# Patient Record
Sex: MALE | Race: WHITE
[De-identification: names, ages, dates, MRNs, and addresses within clinical notes are randomized per-mention and may not be internally consistent; named-entity substitution may affect disease eponyms.]

---

## 2017-10-22 ENCOUNTER — HOSPITAL ENCOUNTER (EMERGENCY)
Dept: HOSPITAL 25 - UCEAST | Age: 52
Discharge: HOME | End: 2017-10-22
Payer: COMMERCIAL

## 2017-10-22 VITALS — SYSTOLIC BLOOD PRESSURE: 153 MMHG | DIASTOLIC BLOOD PRESSURE: 98 MMHG

## 2017-10-22 DIAGNOSIS — L03.114: Primary | ICD-10-CM

## 2017-10-22 DIAGNOSIS — R03.0: ICD-10-CM

## 2017-10-22 PROCEDURE — 99212 OFFICE O/P EST SF 10 MIN: CPT

## 2017-10-22 PROCEDURE — G0463 HOSPITAL OUTPT CLINIC VISIT: HCPCS

## 2017-10-22 NOTE — UC
Neelam COSME Emily, scribed for Carmen Devries DO on 10/22/17 at 1039 .





Skin Complaint HPI





- HPI Summary


HPI Summary: 


This patient is a 52 year old M presenting to urgent care with a chief 

complaint of redness and swelling at left elbow that began 5 days ago. The CC 

is described as a constant burn. The patient rates the pain 6/10 in severity. 

Symptoms aggravated by movement and touch. Symptoms alleviated by nothing. 

Patient reports pain at L elbow, chills, and nausea. Patient denies fever and 

diaphoresis. Patient denies any recent injury or animal bite. Medications 

reviewed this visit.








- History of Current Complaint


Chief Complaint: UCSkin


Time Seen by Provider: 10/22/17 10:29


Stated Complaint: INFECTION L ARM


Hx Obtained From: Patient


Onset/Duration: Sudden Onset, Lasting Days, Still Present


Onset Severity: Moderate


Current Severity: Moderate


Pain Intensity: 6


Pain Scale Used: 0-10 Numeric


Location: Other - L elbow


Character: Swelling, Pain, Redness


Aggravating Factor(s): Touch, Other - Movement


Alleviating Factor(s): Nothing


Associated Signs & Symptoms: Positive: Nausea - resolved, Chills - resolved.  

Negative: Diaphoresis, Fever





- Allergy/Home Medications


Allergies/Adverse Reactions: 


 Allergies











Allergy/AdvReac Type Severity Reaction Status Date / Time


 


No Known Allergies Allergy   Verified 10/22/17 10:27














Review of Systems


Constitutional: Chills, Other - Negative fever


Skin: Other - Positive redness and swelling at L elbow. Negative diaphoresis


Gastrointestinal: Nausea


Musculoskeletal: Other: - Positive pain at L elbow


All Other Systems Reviewed And Are Negative: Yes





PMH/Surg Hx/FS Hx/Imm Hx





- Additional Past Medical History


Additional PMH: 


Shingles


Negative MRSA


Previously Healthy: No


Other Endocrine History: Negative diabetes


Other History Of: 


   Negative For: HIV





- Surgical History


Surgical History: None





- Family History


Known Family History: Positive: Cardiac Disease





- Social History


Occupation: Employed Full-time


Lives: With Family


Alcohol Use: Occasionally


Substance Use Type: None


Smoking Status (MU): Never Smoked Tobacco





- Immunization History


Most Recent Tetanus Shot: 2007





Physical Exam


Triage Information Reviewed: Yes


Appearance: Well-Appearing, No Pain Distress, Well-Nourished


Vital Signs: 


 Initial Vital Signs











Temp  98.5 F   10/22/17 10:18


 


Pulse  72   10/22/17 10:18


 


Resp  16   10/22/17 10:18


 


BP  153/98   10/22/17 10:18


 


Pulse Ox  99   10/22/17 10:18











Vital Signs Reviewed: Yes


Eyes: Positive: Conjunctiva Clear.  Negative: Discharge


ENT: Positive: Hearing grossly normal.  Negative: Muffled/hoarse voice


Neck exam: Normal


Neck: Positive: Supple


Respiratory: Positive: Lungs clear, Normal breath sounds, No respiratory 

distress, No accessory muscle use


Cardiovascular: Positive: RRR, No Murmur


Musculoskeletal Exam: Normal


Neurological: Positive: Alert, Muscle Tone Normal


Psychological Exam: Normal


Psychological: Positive: Age Appropriate Behavior


Skin Exam: Normal


Skin: Positive: Other - Warm, Dry, Normal Color





Course/Dx





- Course


Course Of Treatment: This patient is a 52 year old M presenting to urgent care 

with a chief complaint of redness and swelling at left elbow that began 5 days 

ago. The CC is described as a constant burn. The patient rates the pain 6/10 in 

severity. Symptoms aggravated by movement and touch. Symptoms alleviated by 

nothing. Patient reports pain at L elbow, chills, and nausea. Patient denies 

fever and diaphoresis. Patient denies any recent injury or animal bite. 

Medications reviewed this visit.  Physical Exam Findings.  13x10 cm indurated, 

erythematous, tender lesion positive for calor. No fluctuance. Central area of 

skin breakdown that is not draining.  Medical Decision Making.  High blood 

pressure noted.  Patient will be discharged with prescription for Keflex and 

Bactrim with follow up from PCP.  The patient is agreeable with this plan.





- Differential Diagnoses - Skin Complaint


Differential Diagnoses: Abscess, Cellulitis, Contact Dermatitis, Poison Ivy, 

Urticaria, Varicella Zoster





- Diagnoses


Provider Diagnoses: Cellulitis. Abscess. Elevated blood pressure without 

diagnosis of hypertension.





Discharge





- Discharge Plan


Condition: Stable


Disposition: HOME


Prescriptions: 


Acetaminop/Codeine 30 MG TAB* [Tylenol/Codeine 30 MG TAB*] 1 tab PO BEDTIME PRN 

#5 tab MDD 1 tab


 PRN Reason: Pain


Cephalexin CAP* [Keflex CAP*] 500 mg PO BID #20 cap


Sulfamethox/Trimethoprim DS* [Bactrim /160 TAB*] 1 tab PO BID #20 tab


Patient Education Materials:  Cellulitis (ED), Abscess (ED)


Referrals: 


BRIANNA Diaz [Primary Care Provider] - 2 Days


Additional Instructions: 


Your blood pressure was elevated at this visit. That does not mean you have 

hypertension, it is probably due to your current condition. Please follow up 

with your primary care provider





The documentation as recorded by the Neelam fleming Emily accurately reflects the 

service I personally performed and the decisions made by me, Carmen Devries DO.

## 2018-08-20 ENCOUNTER — HOSPITAL ENCOUNTER (EMERGENCY)
Dept: HOSPITAL 25 - UCCORT | Age: 53
Discharge: HOME | End: 2018-08-20
Payer: COMMERCIAL

## 2018-08-20 VITALS — DIASTOLIC BLOOD PRESSURE: 89 MMHG | SYSTOLIC BLOOD PRESSURE: 154 MMHG
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
## 2018-08-20 DIAGNOSIS — S61.012A: Primary | ICD-10-CM

## 2018-08-20 PROCEDURE — G0463 HOSPITAL OUTPT CLINIC VISIT: HCPCS

## 2018-08-20 PROCEDURE — 12001 RPR S/N/AX/GEN/TRNK 2.5CM/<: CPT

## 2018-08-20 PROCEDURE — 99211 OFF/OP EST MAY X REQ PHY/QHP: CPT

## 2018-08-20 PROCEDURE — 99212 OFFICE O/P EST SF 10 MIN: CPT

## 2018-08-20 NOTE — UC
Laceration HPI





- HPI Summary


HPI Summary: 





pt presents with laceration to left posterior proximal thumb that happened at ~ 

1000 today Bleeding controlled. Pt states he is UTD with tetanus.





- History Of Current Complaint


Chief Complaint: UCLaceration


Stated Complaint: WC-LEFT HAND CUT COMPLAINT


Time Seen by Provider: 08/20/18 10:53


Hx Obtained From: Patient


Laceration Location: Finger - left thumb


Mechanism Of Injury: Sharp Trauma


Onset/Duration: Sudden Onset


Severity: Mild


Pain Intensity: 0


Aggravating Factors: Position, Movement


Related History: Dominant Hand Right





- Allergies/Home Medications


Allergies/Adverse Reactions: 


 Allergies











Allergy/AdvReac Type Severity Reaction Status Date / Time


 


No Known Allergies Allergy   Verified 08/20/18 10:53











Home Medications: 


 Home Medications





NK [No Home Medications Reported]  08/20/18 [History Confirmed 08/20/18]











PMH/Surg Hx/FS Hx/Imm Hx


Previously Healthy: Yes


Other History Of: 


   Negative For: HIV





- Surgical History


Surgical History: Yes


Surgery Procedure, Year, and Place: L arm FB removal via surgery





- Family History


Known Family History: Positive: Cardiac Disease





- Social History


Occupation: Employed Full-time


Lives: With Family


Alcohol Use: Occasionally


Substance Use Type: None


Smoking Status (MU): Never Smoked Tobacco


Have You Smoked in the Last Year: No





- Immunization History


Most Recent Tetanus Shot: 2017





Review of Systems


Constitutional: Negative


Skin: Other - laceration left thumb


Eyes: Negative


ENT: Negative


Respiratory: Negative


Cardiovascular: Negative


Gastrointestinal: Negative


Genitourinary: Negative


Motor: Negative


Neurovascular: Negative


Musculoskeletal: Myalgia - at laceration site


Neurological: Negative


Psychological: Negative


Is Patient Immunocompromised?: No


All Other Systems Reviewed And Are Negative: Yes





Physical Exam


Triage Information Reviewed: Yes


Appearance: Well-Appearing


Vital Signs: 


 Initial Vital Signs











Temp  98.4 F   08/20/18 10:48


 


Pulse  67   08/20/18 10:48


 


Resp  18   08/20/18 10:48


 


BP  154/89   08/20/18 10:48


 


Pulse Ox  99   08/20/18 10:48











Vital Signs Reviewed: Yes


Eye Exam: Normal


ENT: Positive: Hearing grossly normal


Dental Exam: Normal


Neck exam: Normal


Respiratory: Positive: No respiratory distress


Musculoskeletal Exam: Normal


Musculoskeletal: Positive: Strength Intact, ROM Intact


Neurological Exam: Normal


Psychological Exam: Normal


Skin Exam: Other - laceration left thumb





Laceration Repair





- Laceration Repair


  ** 1


Description: Linear


Laceration Size After Repair: Length (cm) - 2, Width (mm) - 2, Depth (mm) - 2


Modified For Repair: No


Cleansing Completed Via Routine Prep: Yes


Closure Material: Skin Adhesive, SteriStrips


Closure Method: Single Layer


Suture Of: Skin





Laceration Course/Dx





- Differential Dx - Laceration/Wound


Differental Diagnoses: Laceration


Provider Diagnoses: laceration repair to left thumb





Discharge





- Sign-Out/Discharge


Documenting (check all that apply): Patient Departure


All imaging exams completed and their final reports reviewed: No Studies





- Discharge Plan


Condition: Stable


Disposition: HOME


Patient Education Materials:  Finger Laceration (ED), Skin Adhesive Care (ED)


Referrals: 


Miguelangel Celis MD [Primary Care Provider] - 





- Billing Disposition and Condition


Condition: STABLE


Disposition: Home